# Patient Record
(demographics unavailable — no encounter records)

---

## 2025-03-04 NOTE — HISTORY OF PRESENT ILLNESS
[FreeTextEntry1] : Mrs. Yolanda Alvarenga returned to the office having been last seen on 2024.  She is a 93-year-old right-handed patient with temporal lobe epilepsy diagnosed in .  She had been seizure-free for many years on lamotrigine 50 mg twice a day.  She had longstanding anxiety and was very high strung.  She remained on venlafaxine 183.7 mg daily aspirin 81 mg daily, enalapril, lamotrigine 50 mg twice a day, rosuvastatin.  At her 2024 visit, she was accompanied to the office by her daughter Carmela ([phone]) with whom she resided and her other daughter Swetha.  They described her as functional.  She resisted showering.  She liked to stay in bed all day.  She described herself as nervous and high strung.  She scored 21 out of 30 on MMSE.  She was amnestic.  Since her last visit, she has fallen several times requiring ER visits.  She broke her left wrist and ribs.  She underwent imaging at [facility].  According to her daughter Carmela, she is confused.  Mrs. Linares often calls Carmela her mother.  She believes that her   is alive.  She is often combative.  She stays in bed quite frequently.  She is verbally abusive.  She is a bit paranoid but does not hallucinate.  Medications include venlafaxine 150 mg daily, aspirin 81 mg/day, enalapril, lamotrigine 50 mg twice a day and rosuvastatin.  She has been free from seizures.  Past surgical history is notable for tonsillectomy, excision of basal cell carcinoma and cataract extractions.  She suffers of hypertension, diabetes and hyperlipidemia.  She has no drug allergies.  She is a former smoker and social drinker.  She is  and lives with her daughter.  Family history is notable for heart disease.

## 2025-03-04 NOTE — CONSULT LETTER
[Dear  ___] : Dear  [unfilled], [Consult Letter:] : I had the pleasure of evaluating your patient, [unfilled]. [Please see my note below.] : Please see my note below. [Consult Closing:] : Thank you very much for allowing me to participate in the care of this patient.  If you have any questions, please do not hesitate to contact me. [Sincerely,] : Sincerely, [FreeTextEntry3] : Genaro Toledo MD\par

## 2025-03-04 NOTE — PHYSICAL EXAM
[FreeTextEntry1] : Constitutional:  Patient was well-developed, well-nourished and in no acute distress.   Head:  Normocephalic, atraumatic. Tympanic membranes were not examined.  Neck:  Supple with full range of motion.   Cardiovascular:  Cardiac rhythm was regular without murmur. There were no carotid bruits. Peripheral pulses were full and symmetric.   Respiratory:  Lungs were clear.   Abdomen:  Soft and nontender.   Spine:  Nontender.   Skin:  There were no rashes.   NEUROLOGICAL EXAMINATION:  Mental Status: Patient was alert and very talkative.  She was disoriented to date had poor attention span and impaired recall.  She scored 17 out of 30 on MMSE.  Cranial Nerves:   II: She could finger count bilaterally.  Pupils were surgical but reactive. Visual fields were full.  Fundi were not examined.  III, IV, VI:  Eye movements were full without nystagmus.   V: Facial sensation was intact.   VII: Facial strength was normal.   VIII: Hearing was equal.   IX, X: Palatal movement was normal. Phonation was normal.   XI: Sternocleidomastoids and trapezii were normal.   XII: Tongue was midline and movements normal. There was no lingual atrophy or fasciculations.   Motor Examination: Muscle bulk, tone and strength were normal.   Sensory Examination: Pinprick and joint position sense were intact.  Vibration was mildly diminished in the feet.  Reflexes: DTRs were 2 throughout.   Plantar Responses: Plantar responses were flexor.   Coordination/Cerebellar Function: There was no dysmetria on finger to nose or heel to shin testing.   Gait/Stance: Gait was mildly short stepped but stable.  She was unsteady on turning.  She swayed on Romberg testing.  Tandem was unsteady.

## 2025-03-04 NOTE — ASSESSMENT
[FreeTextEntry1] : Mrs. Alvarenga is a 93-year-old with a well-controlled temporal lobe seizure disorder on lamotrigine 50 mg twice a day.  She has longstanding amnestic symptoms and behavioral issues.  She has a very frontal lobe personality.  I again suggested geriatric psychiatry consultation.  Because of medication noncompliance, I prescribed lamotrigine  mg daily which will simplify her medication administration.  I suggested a routine follow-up visit in 9 months.  Telephone contact will be maintained in the meantime.

## 2025-03-04 NOTE — REVIEW OF SYSTEMS
[Memory Lapses or Loss] : memory loss [Anxiety] : anxiety [Emotional Problems] : emotional problems [Negative] : Heme/Lymph